# Patient Record
Sex: FEMALE | Race: WHITE
[De-identification: names, ages, dates, MRNs, and addresses within clinical notes are randomized per-mention and may not be internally consistent; named-entity substitution may affect disease eponyms.]

---

## 2022-05-10 ENCOUNTER — HOSPITAL ENCOUNTER (INPATIENT)
Dept: HOSPITAL 54 - ER | Age: 62
LOS: 15 days | Discharge: NURSING, REHAB OR LONG-TERM CARE | DRG: 885 | End: 2022-05-25
Attending: INTERNAL MEDICINE | Admitting: PSYCHIATRY & NEUROLOGY
Payer: MEDICARE

## 2022-05-10 VITALS — WEIGHT: 155 LBS | BODY MASS INDEX: 23.49 KG/M2 | HEIGHT: 68 IN

## 2022-05-10 VITALS — SYSTOLIC BLOOD PRESSURE: 125 MMHG | DIASTOLIC BLOOD PRESSURE: 91 MMHG

## 2022-05-10 VITALS — SYSTOLIC BLOOD PRESSURE: 144 MMHG | DIASTOLIC BLOOD PRESSURE: 65 MMHG

## 2022-05-10 DIAGNOSIS — E03.9: ICD-10-CM

## 2022-05-10 DIAGNOSIS — Z20.822: ICD-10-CM

## 2022-05-10 DIAGNOSIS — M62.81: ICD-10-CM

## 2022-05-10 DIAGNOSIS — E87.6: ICD-10-CM

## 2022-05-10 DIAGNOSIS — F41.9: ICD-10-CM

## 2022-05-10 DIAGNOSIS — F31.64: ICD-10-CM

## 2022-05-10 DIAGNOSIS — F29: Primary | ICD-10-CM

## 2022-05-10 DIAGNOSIS — Z73.6: ICD-10-CM

## 2022-05-10 DIAGNOSIS — K21.9: ICD-10-CM

## 2022-05-10 DIAGNOSIS — G89.4: ICD-10-CM

## 2022-05-10 DIAGNOSIS — F02.80: ICD-10-CM

## 2022-05-10 DIAGNOSIS — G31.83: ICD-10-CM

## 2022-05-10 LAB
ALBUMIN SERPL BCP-MCNC: 4.2 G/DL (ref 3.4–5)
ALP SERPL-CCNC: 84 U/L (ref 46–116)
ALT SERPL W P-5'-P-CCNC: 26 U/L (ref 12–78)
APAP SERPL-MCNC: < 10 UG/ML (ref 10–30)
AST SERPL W P-5'-P-CCNC: 17 U/L (ref 15–37)
BASOPHILS # BLD AUTO: 0 K/UL (ref 0–0.2)
BASOPHILS NFR BLD AUTO: 0.2 % (ref 0–2)
BILIRUB DIRECT SERPL-MCNC: 0.1 MG/DL (ref 0–0.2)
BILIRUB SERPL-MCNC: 0.2 MG/DL (ref 0.2–1)
BILIRUB UR QL STRIP: NEGATIVE
BUN SERPL-MCNC: 10 MG/DL (ref 7–18)
CALCIUM SERPL-MCNC: 9.6 MG/DL (ref 8.5–10.1)
CHLORIDE SERPL-SCNC: 107 MMOL/L (ref 98–107)
CO2 SERPL-SCNC: 28 MMOL/L (ref 21–32)
COLOR UR: YELLOW
CREAT SERPL-MCNC: 0.4 MG/DL (ref 0.6–1.3)
EOSINOPHIL NFR BLD AUTO: 1.4 % (ref 0–6)
ETHANOL SERPL-MCNC: < 3 MG/DL (ref 0–0)
GLUCOSE SERPL-MCNC: 117 MG/DL (ref 74–106)
GLUCOSE UR STRIP-MCNC: NEGATIVE MG/DL
HCT VFR BLD AUTO: 41 % (ref 33–45)
HGB BLD-MCNC: 13.3 G/DL (ref 11.5–14.8)
LEUKOCYTE ESTERASE UR QL STRIP: NEGATIVE
LYMPHOCYTES NFR BLD AUTO: 3.1 K/UL (ref 0.8–4.8)
LYMPHOCYTES NFR BLD AUTO: 35 % (ref 20–44)
MCHC RBC AUTO-ENTMCNC: 33 G/DL (ref 31–36)
MCV RBC AUTO: 90 FL (ref 82–100)
MONOCYTES NFR BLD AUTO: 0.5 K/UL (ref 0.1–1.3)
MONOCYTES NFR BLD AUTO: 5.2 % (ref 2–12)
NEUTROPHILS # BLD AUTO: 5.1 K/UL (ref 1.8–8.9)
NEUTROPHILS NFR BLD AUTO: 58.2 % (ref 43–81)
NITRITE UR QL STRIP: NEGATIVE
PH UR STRIP: 6 [PH] (ref 5–8)
PLATELET # BLD AUTO: 250 K/UL (ref 150–450)
POTASSIUM SERPL-SCNC: 2.9 MMOL/L (ref 3.5–5.1)
PROT SERPL-MCNC: 7.8 G/DL (ref 6.4–8.2)
PROT UR QL STRIP: NEGATIVE MG/DL
RBC # BLD AUTO: 4.53 MIL/UL (ref 4–5.2)
SODIUM SERPL-SCNC: 143 MMOL/L (ref 136–145)
UROBILINOGEN UR STRIP-MCNC: 0.2 EU/DL
WBC NRBC COR # BLD AUTO: 8.7 K/UL (ref 4.3–11)

## 2022-05-10 PROCEDURE — C9803 HOPD COVID-19 SPEC COLLECT: HCPCS

## 2022-05-10 PROCEDURE — G0480 DRUG TEST DEF 1-7 CLASSES: HCPCS

## 2022-05-10 RX ADMIN — ATORVASTATIN CALCIUM SCH MG: 40 TABLET, FILM COATED ORAL at 17:17

## 2022-05-10 RX ADMIN — ZOLPIDEM TARTRATE PRN MG: 5 TABLET, FILM COATED ORAL at 21:26

## 2022-05-10 RX ADMIN — OXYCODONE HYDROCHLORIDE AND ACETAMINOPHEN PRN UDTAB: 5; 325 TABLET ORAL at 11:09

## 2022-05-10 RX ADMIN — OXYCODONE HYDROCHLORIDE AND ACETAMINOPHEN PRN UDTAB: 5; 325 TABLET ORAL at 17:18

## 2022-05-10 RX ADMIN — OLANZAPINE SCH MG: 5 TABLET, ORALLY DISINTEGRATING ORAL at 12:56

## 2022-05-10 RX ADMIN — LATANOPROST SCH ML: 50 SOLUTION OPHTHALMIC at 21:34

## 2022-05-10 RX ADMIN — FAMOTIDINE SCH MG: 20 TABLET, FILM COATED ORAL at 21:26

## 2022-05-10 RX ADMIN — OLANZAPINE SCH MG: 5 TABLET, ORALLY DISINTEGRATING ORAL at 16:09

## 2022-05-10 NOTE — NUR
JOSE FROM [facility] PRE [facility]YCH ADMISSION. ON 5250 
FOR GD. PATIENT IN BED 12 AWAITING MD PIERRE.

## 2022-05-10 NOTE — NUR
Clinical Social Work Note:



Patient placed on a 5150 for GD due to pt being delusional at home and has been knocking on 
neighbors doors. Patient has been experiencing hallucinations at home. Patient then placed 
on a 5250 for GD. Patient currently resides at [address] [place] [address]. SW was 
able to locate pt's mother Luzma Hoffman ([phone]) to gather information and mother who is 
elderly 92. She reported she lives with pt and takes care of pt. Mother reported this was 
her first episode but has been in and out of [facility].

## 2022-05-10 NOTE — NUR
ORALIA Initial Discharge Plan:

Patient currently resides at [address] [place] [address]. Patient unable to state 
who she lives with. ORALIA was able to located pt's mother Luzma Hoffman ([phone]) to gather 
information and mother who is elderly 92. She reported she lives with pt and takes care of 
pt. Mother reported this was her first episode. ORALIA will work with the MD and treatment team 
to help coordinate appropriate discharge.

## 2022-05-10 NOTE — NUR
RN ADMIT NOTE- 63 Y/O Luxembourgish FEMALE ADMITTED ON PSYCHIATRIC HOLD FOR GD. PT FOUND TO BE 
HAVING HALLUCINATIONS, WANDERING NEIGHBORHOOD, KNOCKING ON NEIGHBORS DOORS AND WINDOWS, 
DISRUPTIVE AND UNABLE TO CARE FOR SELF. PT PRESENTS ALERT ORIENTED TO PERSON ONLY, BIZARRE 
GESTURES AND FACIAL EXPRESSIONS. STATES " EVERYONE GET DOWN. BOMBS ARE DROPPING." PT MADE 
FINGERS OF HAND INTO PISTOL SHAPE AND POINTED AT STAFF. PT STARES AND IS PARANOID AND 
GUARDED. VS- BP- 153/93, HR- 89, RR- 20, T- 98.0, O2 SATS 100% RA. CURRENTLY AMBULATORY, 
DIRECTABLE AND CALM THOUGH BIZARRE. NEEDS ATTENDED.L ADMIT AT THIS TIME. MD AWARE,. ORDERS 
RECEIVED. MONITOR FOR SAFETY AND BEHAVIORS.

## 2022-05-10 NOTE — NUR
GPS RN NOTES



PATIENT IN HER ROOM RESTING COMFORTABLY. AWAKE, ALERT AND ORIENTED X1, NO S/SX 

ACUTE DISTRESS NOTED. PATIENT REMAINS CONFUSED, TRYING TO GO INTO OTHER PATIENT'S ROOM, 
REDIRECTABLE. SAFETY PRECAUTIONS IN PLACE, WILL CONTINUE TO MONITOR Q15 MIN ROUNDS FOR 
SAFETY AND BEHAVIOR.

## 2022-05-10 NOTE — NUR
ORALIA Family Contact:

 

ORALIA contacted pt's mother Luzma ([phone]) to gather collateral. ORALIA was able to locate 
pt's mother Luzma Hoffman ([phone]) to gather information and mother who is elderly 92. 
She reported she lives with pt and takes care of pt. Mother reported this was her first 
episode but has been in and out of [facility].

## 2022-05-11 VITALS — SYSTOLIC BLOOD PRESSURE: 130 MMHG | DIASTOLIC BLOOD PRESSURE: 69 MMHG

## 2022-05-11 VITALS — DIASTOLIC BLOOD PRESSURE: 78 MMHG | SYSTOLIC BLOOD PRESSURE: 143 MMHG

## 2022-05-11 LAB
ALBUMIN SERPL BCP-MCNC: 3.6 G/DL (ref 3.4–5)
ALP SERPL-CCNC: 80 U/L (ref 46–116)
ALT SERPL W P-5'-P-CCNC: 29 U/L (ref 12–78)
AST SERPL W P-5'-P-CCNC: 32 U/L (ref 15–37)
BILIRUB SERPL-MCNC: 0.3 MG/DL (ref 0.2–1)
BUN SERPL-MCNC: 10 MG/DL (ref 7–18)
CALCIUM SERPL-MCNC: 9.5 MG/DL (ref 8.5–10.1)
CHLORIDE SERPL-SCNC: 107 MMOL/L (ref 98–107)
CHOLEST SERPL-MCNC: 179 MG/DL (ref ?–200)
CO2 SERPL-SCNC: 20 MMOL/L (ref 21–32)
CREAT SERPL-MCNC: 0.5 MG/DL (ref 0.6–1.3)
GLUCOSE SERPL-MCNC: 106 MG/DL (ref 74–106)
HDLC SERPL-MCNC: 62 MG/DL (ref 40–60)
LDLC SERPL DIRECT ASSAY-MCNC: 75 MG/DL (ref 0–99)
POTASSIUM SERPL-SCNC: 4.4 MMOL/L (ref 3.5–5.1)
PROT SERPL-MCNC: 7.5 G/DL (ref 6.4–8.2)
SODIUM SERPL-SCNC: 145 MMOL/L (ref 136–145)
TRIGL SERPL-MCNC: 233 MG/DL (ref 30–150)

## 2022-05-11 RX ADMIN — Medication SCH MG: at 09:44

## 2022-05-11 RX ADMIN — ACETAMINOPHEN PRN MG: 325 TABLET ORAL at 02:01

## 2022-05-11 RX ADMIN — OLANZAPINE SCH MG: 5 TABLET, ORALLY DISINTEGRATING ORAL at 16:48

## 2022-05-11 RX ADMIN — LATANOPROST SCH ML: 50 SOLUTION OPHTHALMIC at 21:19

## 2022-05-11 RX ADMIN — LORAZEPAM PRN MG: 0.5 TABLET ORAL at 00:23

## 2022-05-11 RX ADMIN — FAMOTIDINE SCH MG: 20 TABLET, FILM COATED ORAL at 21:14

## 2022-05-11 RX ADMIN — PANTOPRAZOLE SODIUM SCH MG: 40 TABLET, DELAYED RELEASE ORAL at 09:42

## 2022-05-11 RX ADMIN — OLANZAPINE SCH MG: 5 TABLET, ORALLY DISINTEGRATING ORAL at 09:00

## 2022-05-11 RX ADMIN — ATORVASTATIN CALCIUM SCH MG: 40 TABLET, FILM COATED ORAL at 17:45

## 2022-05-11 RX ADMIN — OXYCODONE HYDROCHLORIDE AND ACETAMINOPHEN PRN UDTAB: 5; 325 TABLET ORAL at 21:15

## 2022-05-11 RX ADMIN — LEVOTHYROXINE SODIUM SCH MCG: 125 TABLET ORAL at 09:42

## 2022-05-11 NOTE — NUR
Pt. pushed the front door and able to go out from the unit and staff able to bring her back 
and became combative and aggressive. Dr. Kendrick made aware and ordered Zyprexa 10 mg IM.

-------------------------------------------------------------------------------

Addendum: 05/11/22 at 1329 by LISA ELIZABETH RN

-------------------------------------------------------------------------------

Pts. mother made aware of the incident.

## 2022-05-11 NOTE — NUR
GPS RN OPENING NOTES:

RECEIVED PATIENT IN HALLWAY SITTING IN MICKY CHAIR, A/O X1, PASSIVE, WITHDRAWN,  
DISORGANIZED, DISORIENTED, CONFUSED. DENIES SI/HI AT THIS TIME. DENIES PAIN AT THIS TIME. NO 
S/S OF DISTRESS. RESPIRATION EVEN AND UNLABORED WITH EQUAL RISE AND FALL OF THE CHEST, ON 
ROOM AIR. OFFERED FLUID AND SNACKS AS TOLERATED. WILL CONTINUE TO MONITOR Q15 FOR MOOD, 
SAFETY AND BEHAVIOR.

## 2022-05-11 NOTE — NUR
Pt. seen in sitting in the floor in the hallway.  saw and said she did not 
fall, she put herself in the floor. Per pt. she did not fall and said she was tired.

## 2022-05-12 VITALS — DIASTOLIC BLOOD PRESSURE: 76 MMHG | SYSTOLIC BLOOD PRESSURE: 134 MMHG

## 2022-05-12 VITALS — DIASTOLIC BLOOD PRESSURE: 74 MMHG | SYSTOLIC BLOOD PRESSURE: 126 MMHG

## 2022-05-12 VITALS — DIASTOLIC BLOOD PRESSURE: 59 MMHG | SYSTOLIC BLOOD PRESSURE: 107 MMHG

## 2022-05-12 RX ADMIN — OLANZAPINE SCH MG: 5 TABLET, ORALLY DISINTEGRATING ORAL at 08:31

## 2022-05-12 RX ADMIN — ZOLPIDEM TARTRATE PRN MG: 5 TABLET, FILM COATED ORAL at 23:50

## 2022-05-12 RX ADMIN — LATANOPROST SCH ML: 50 SOLUTION OPHTHALMIC at 21:23

## 2022-05-12 RX ADMIN — PANTOPRAZOLE SODIUM SCH MG: 40 TABLET, DELAYED RELEASE ORAL at 08:31

## 2022-05-12 RX ADMIN — ATORVASTATIN CALCIUM SCH MG: 40 TABLET, FILM COATED ORAL at 17:12

## 2022-05-12 RX ADMIN — OXYCODONE HYDROCHLORIDE AND ACETAMINOPHEN PRN UDTAB: 5; 325 TABLET ORAL at 21:31

## 2022-05-12 RX ADMIN — OXYCODONE HYDROCHLORIDE AND ACETAMINOPHEN PRN UDTAB: 5; 325 TABLET ORAL at 14:21

## 2022-05-12 RX ADMIN — LEVOTHYROXINE SODIUM SCH MCG: 125 TABLET ORAL at 08:31

## 2022-05-12 RX ADMIN — Medication SCH MG: at 08:31

## 2022-05-12 RX ADMIN — OLANZAPINE SCH MG: 5 TABLET, ORALLY DISINTEGRATING ORAL at 16:18

## 2022-05-12 RX ADMIN — FAMOTIDINE SCH MG: 20 TABLET, FILM COATED ORAL at 21:22

## 2022-05-12 NOTE — NUR
RN NOTE: PAIN



PATIENT C/O LOWER BACK PAIN 8/10 AND WANTED TO TAKE PERCOCET . PRN PERCOCET 5/325 PO 
ADMINISTERED. WILL CONTINUE TO MONITOR.

## 2022-05-12 NOTE — NUR
RN Notes:

Received pt. awake in bed, responsive to staffs, quiet and no distress and no agitation 
noted. Ate 100% for breakfast, compliant on meds. Encouraged to verbalize feelings and 
motivated to attend group activity. Will continue to monitor for safety.

## 2022-05-12 NOTE — NUR
RN NOTES: PATIENT IN HER ROOM RESTING . AWAKE, ALERT, NO S/SX ACUTE DISTRESS NOTED. PATIENT 
REMAINS CONFUSED, PT. TOOK SHOWER, NEEDS FREQUENTLY REDIRECTIONS, REDIRECTABLE. SAFETY 
PRECAUTIONS IN PLACE, WILL CONTINUE TO MONITOR Q15 MIN ROUNDS FOR SAFETY AND BEHAVIOR.

## 2022-05-12 NOTE — NUR
RN NOTES: INSOMNIA:

PT. C/O UNABLE TO SLEEP , PRN AMBIEN 5 MG PO GIVEN PER PT. REQUEST, WILL CONTINUE TO 
MONITOR.

## 2022-05-13 VITALS — SYSTOLIC BLOOD PRESSURE: 133 MMHG | DIASTOLIC BLOOD PRESSURE: 86 MMHG

## 2022-05-13 VITALS — SYSTOLIC BLOOD PRESSURE: 122 MMHG | DIASTOLIC BLOOD PRESSURE: 76 MMHG

## 2022-05-13 VITALS — DIASTOLIC BLOOD PRESSURE: 74 MMHG | SYSTOLIC BLOOD PRESSURE: 132 MMHG

## 2022-05-13 RX ADMIN — CYCLOBENZAPRINE HYDROCHLORIDE PRN MG: 10 TABLET, FILM COATED ORAL at 09:04

## 2022-05-13 RX ADMIN — LATANOPROST SCH ML: 50 SOLUTION OPHTHALMIC at 21:06

## 2022-05-13 RX ADMIN — FAMOTIDINE SCH MG: 20 TABLET, FILM COATED ORAL at 21:04

## 2022-05-13 RX ADMIN — OXYCODONE HYDROCHLORIDE AND ACETAMINOPHEN PRN UDTAB: 5; 325 TABLET ORAL at 09:33

## 2022-05-13 RX ADMIN — OLANZAPINE SCH MG: 5 TABLET, ORALLY DISINTEGRATING ORAL at 09:00

## 2022-05-13 RX ADMIN — LEVOTHYROXINE SODIUM SCH MCG: 125 TABLET ORAL at 09:00

## 2022-05-13 RX ADMIN — ATORVASTATIN CALCIUM SCH MG: 40 TABLET, FILM COATED ORAL at 18:15

## 2022-05-13 RX ADMIN — ZOLPIDEM TARTRATE PRN MG: 5 TABLET, FILM COATED ORAL at 22:29

## 2022-05-13 RX ADMIN — Medication SCH MG: at 09:00

## 2022-05-13 RX ADMIN — PANTOPRAZOLE SODIUM SCH MG: 40 TABLET, DELAYED RELEASE ORAL at 09:00

## 2022-05-13 RX ADMIN — OLANZAPINE SCH MG: 5 TABLET, ORALLY DISINTEGRATING ORAL at 18:15

## 2022-05-13 RX ADMIN — LORAZEPAM PRN MG: 0.5 TABLET ORAL at 19:57

## 2022-05-13 NOTE — NUR
RN NOTES: PATIENT IN HER ROOM RESTING . AWAKE, ALERT, NO S/SX ACUTE DISTRESS NOTED. PATIENT 
REMAINS CONFUSED, PT. ANXIOUS EASILY AGITATED, NEEDS FREQUENTLY REDIRECTIONS, REDIRECTABLE. 
SAFETY PRECAUTIONS IN PLACE, WILL CONTINUE TO MONITOR Q15 MIN ROUNDS FOR SAFETY AND 
BEHAVIOR.

## 2022-05-13 NOTE — NUR
RN NOTES: ANXIETY 

PT. C/O FEELING ANXIOUS ,PACING IN ROOM,RESTLESS,PARANOID  PRN ATIVAN 1 MG PO GIVEN , WILL 
CONTINUE TO MONITOR.

## 2022-05-14 VITALS — DIASTOLIC BLOOD PRESSURE: 86 MMHG | SYSTOLIC BLOOD PRESSURE: 139 MMHG

## 2022-05-14 VITALS — SYSTOLIC BLOOD PRESSURE: 146 MMHG | DIASTOLIC BLOOD PRESSURE: 87 MMHG

## 2022-05-14 VITALS — DIASTOLIC BLOOD PRESSURE: 88 MMHG | SYSTOLIC BLOOD PRESSURE: 109 MMHG

## 2022-05-14 LAB — TSH SERPL DL<=0.005 MIU/L-ACNC: 3.36 UIU/ML (ref 0.36–3.74)

## 2022-05-14 RX ADMIN — LORAZEPAM PRN MG: 0.5 TABLET ORAL at 10:58

## 2022-05-14 RX ADMIN — OLANZAPINE SCH MG: 5 TABLET, ORALLY DISINTEGRATING ORAL at 17:38

## 2022-05-14 RX ADMIN — LATANOPROST SCH DROP: 50 SOLUTION OPHTHALMIC at 21:24

## 2022-05-14 RX ADMIN — OLANZAPINE SCH MG: 5 TABLET, ORALLY DISINTEGRATING ORAL at 08:05

## 2022-05-14 RX ADMIN — OXYCODONE HYDROCHLORIDE AND ACETAMINOPHEN PRN UDTAB: 5; 325 TABLET ORAL at 18:37

## 2022-05-14 RX ADMIN — FAMOTIDINE SCH MG: 20 TABLET, FILM COATED ORAL at 21:24

## 2022-05-14 RX ADMIN — OXYCODONE HYDROCHLORIDE AND ACETAMINOPHEN PRN UDTAB: 5; 325 TABLET ORAL at 11:53

## 2022-05-14 RX ADMIN — ATORVASTATIN CALCIUM SCH MG: 40 TABLET, FILM COATED ORAL at 17:38

## 2022-05-14 RX ADMIN — LEVOTHYROXINE SODIUM SCH MCG: 125 TABLET ORAL at 08:05

## 2022-05-14 RX ADMIN — Medication SCH MG: at 08:05

## 2022-05-14 RX ADMIN — ZOLPIDEM TARTRATE PRN MG: 5 TABLET, FILM COATED ORAL at 21:24

## 2022-05-14 RX ADMIN — PANTOPRAZOLE SODIUM SCH MG: 40 TABLET, DELAYED RELEASE ORAL at 08:05

## 2022-05-14 NOTE — NUR
GPS RN NOTE, PATIENT HAS A COMPLAINT OF NOT BEING ABLE TO SLEEP AND IS REQUESTING AMBIEN AT 
THIS TIME.  PATIENT VITAL SIGNS ARE STABLE.  GAVE AMBIEN 5MG PO HS PRN AS ORDERED.  WILL 
REASSESS FOR INSOMNIA AND I WILL CONTINUE TO MONITOR THIS PATIENT WITH THE HELP OF STAFF.

## 2022-05-14 NOTE — NUR
GPS RN NOTE, RECEIVED PATIENT AWAKE AND IN BED, NO S/S OR COMPLAINTS OF PAIN AT THIS TIME.  
PATIENT IS DISPLAYING NO S/S OF APPARENT DISTRESS AT THIS TIME.  PATIENT BREATHING IS 
UNLABORED WITH EQUAL RISE AND FALL OF THE CHEST.  PATIENT IS ALERT AND ORIENTED X 2 ON ROOM 
AIR WITH A SPO2 99%.  PATIENT IS COMPLIANT WITH MEDICATIONS, ANXIOUS, MAKES NEEDS KNOWN, AND 
COOPERATIVE.  PATIENT DOES HAVE AUDITORY HALLUCINATIONS TELLING HER THAT HER SISTER IS UP 
STAIRS ON THE THIRD FLOOR.  PATIENT DENIES SUICIDAL AND HOMICIDAL IDEATIONS AT THIS TIME.  
PATIENT ASSISTED WITH TURNING AND REPOSITIONING Q2HR AND PRN FOR COMFORT AND CIRCULATION.  
PATIENT HAS NO NEEDS AT THIS TIME.  PATIENT EDUCATED ON THE USE OF THE CALL BELL.  PATIENT 
BED SIDE RAILS UP X 2 FOR SAFETY.  PATIENT BED IS LOCKED, LOW, WITH BED ALARM ON.  WILL 
CONTINUE TO MONITOR THIS PATIENT Q15 MINUTES WITH THE HELP OF STAFF TO MAINTAIN SAFETY.

## 2022-05-14 NOTE — NUR
GPS/RN PT. IS ANXIOUS, PACING IN  THE HALLWAY, RESTLESS,PARANOID  PRN ATIVAN 1 MG PO GIVEN , 
WILL CONTINUE TO MONITOR.

## 2022-05-15 VITALS — DIASTOLIC BLOOD PRESSURE: 84 MMHG | SYSTOLIC BLOOD PRESSURE: 120 MMHG

## 2022-05-15 VITALS — SYSTOLIC BLOOD PRESSURE: 125 MMHG | DIASTOLIC BLOOD PRESSURE: 83 MMHG

## 2022-05-15 VITALS — DIASTOLIC BLOOD PRESSURE: 93 MMHG | SYSTOLIC BLOOD PRESSURE: 114 MMHG

## 2022-05-15 RX ADMIN — OXYCODONE HYDROCHLORIDE AND ACETAMINOPHEN PRN UDTAB: 5; 325 TABLET ORAL at 08:49

## 2022-05-15 RX ADMIN — OLANZAPINE SCH MG: 5 TABLET, ORALLY DISINTEGRATING ORAL at 08:32

## 2022-05-15 RX ADMIN — OLANZAPINE SCH MG: 5 TABLET, ORALLY DISINTEGRATING ORAL at 17:22

## 2022-05-15 RX ADMIN — ACETAMINOPHEN PRN MG: 325 TABLET ORAL at 19:29

## 2022-05-15 RX ADMIN — CYCLOBENZAPRINE HYDROCHLORIDE PRN MG: 10 TABLET, FILM COATED ORAL at 22:16

## 2022-05-15 RX ADMIN — LORAZEPAM PRN MG: 0.5 TABLET ORAL at 19:26

## 2022-05-15 RX ADMIN — LEVOTHYROXINE SODIUM SCH MCG: 125 TABLET ORAL at 06:33

## 2022-05-15 RX ADMIN — LATANOPROST SCH DROP: 50 SOLUTION OPHTHALMIC at 22:16

## 2022-05-15 RX ADMIN — ATORVASTATIN CALCIUM SCH MG: 40 TABLET, FILM COATED ORAL at 17:22

## 2022-05-15 RX ADMIN — LORAZEPAM PRN MG: 0.5 TABLET ORAL at 04:44

## 2022-05-15 RX ADMIN — Medication SCH MG: at 08:32

## 2022-05-15 RX ADMIN — PANTOPRAZOLE SODIUM SCH MG: 40 TABLET, DELAYED RELEASE ORAL at 06:33

## 2022-05-15 RX ADMIN — FAMOTIDINE SCH MG: 20 TABLET, FILM COATED ORAL at 22:16

## 2022-05-15 NOTE — NUR
GPS/RN RECEIVED PT RESTING IN HER BED, PASSIVE, WITHDRAWN,  GUARDED, DISORGANIZED, 
DISORIENTED. DENIES SI/HI AT THIS TIME. DENIES PAIN AT THIS TIME. NO S/S OF ACUTE DISTRESS. 
DAILY MEDS COMPLIANT. AMBULATORY.ALL NEEDS 

ATTENDED AND ANTICIPATED. WILL CONTINUE TO MONITOR Q15 FOR MOOD, SAFETY AND BEHAVIOR.

## 2022-05-15 NOTE — NUR
GPS RN NOTES:

PATIENT IS AGITATED, COMBATIVE, BANGING ON WINDOWS AND RUNNING TOWARDS EXIT DOOR. REFUSING 
REDIRECTION. ATIVAN 0.5MG 2TABS GIVEN PO PRN AS ORDERED. WILL CONTINUE TO MONITOR.

## 2022-05-15 NOTE — NUR
GPS RN NOTE, PATIENT HAS A COMPLAINT OF FEELING ANXIOUS AND IS REQUESTING ATIVAN AT THIS 
TIME.  PATIENT VITAL SIGNS ARE STABLE.  GAVE ATIVAN 1MG PO Q6HR PRN AS ORDERED.  WILL 
REASSESS FOR ANXIETY AND I WILL CONTINUE TO MONITOR THIS PATIENT WITH THE HELP OF STAFF.

## 2022-05-15 NOTE — NUR
GPS RN OPENING NOTES:

RECEIVED PATIENT AGITATED, COMBATIVE, BANGING ON WINDOW, RUNNING TOWARDS EXIT DOOR, 
DISORGANIZED, DISORIENTED, CONFUSED, PARANOID, REFUSING REDIRECTION. ATIVAN 1MG GIVEN PO. NO 
S/S OF DISTRESS. RESPIRATION EVEN AND UNLABORED WITH EQUAL RISE AND FALL OF THE CHEST, ON 
ROOM AIR. OFFERED FLUID AND SNACKS AS TOLERATED. WILL CONTINUE TO MONITOR Q15 FOR MOOD, 
SAFETY AND BEHAVIOR.

## 2022-05-16 VITALS — DIASTOLIC BLOOD PRESSURE: 76 MMHG | SYSTOLIC BLOOD PRESSURE: 122 MMHG

## 2022-05-16 VITALS — SYSTOLIC BLOOD PRESSURE: 121 MMHG | DIASTOLIC BLOOD PRESSURE: 74 MMHG

## 2022-05-16 VITALS — DIASTOLIC BLOOD PRESSURE: 73 MMHG | SYSTOLIC BLOOD PRESSURE: 128 MMHG

## 2022-05-16 RX ADMIN — LORAZEPAM PRN MG: 0.5 TABLET ORAL at 14:54

## 2022-05-16 RX ADMIN — ATORVASTATIN CALCIUM SCH MG: 40 TABLET, FILM COATED ORAL at 17:05

## 2022-05-16 RX ADMIN — PANTOPRAZOLE SODIUM SCH MG: 40 TABLET, DELAYED RELEASE ORAL at 08:08

## 2022-05-16 RX ADMIN — OXYCODONE HYDROCHLORIDE AND ACETAMINOPHEN PRN UDTAB: 5; 325 TABLET ORAL at 22:18

## 2022-05-16 RX ADMIN — FAMOTIDINE SCH MG: 20 TABLET, FILM COATED ORAL at 21:30

## 2022-05-16 RX ADMIN — Medication SCH MG: at 08:08

## 2022-05-16 RX ADMIN — OLANZAPINE SCH MG: 5 TABLET, ORALLY DISINTEGRATING ORAL at 10:03

## 2022-05-16 RX ADMIN — LEVOTHYROXINE SODIUM SCH MCG: 125 TABLET ORAL at 08:08

## 2022-05-16 RX ADMIN — OLANZAPINE SCH MG: 5 TABLET, ORALLY DISINTEGRATING ORAL at 17:05

## 2022-05-16 RX ADMIN — LORAZEPAM PRN MG: 0.5 TABLET ORAL at 22:20

## 2022-05-16 RX ADMIN — OLANZAPINE SCH MG: 5 TABLET, ORALLY DISINTEGRATING ORAL at 08:08

## 2022-05-16 RX ADMIN — LORAZEPAM PRN MG: 0.5 TABLET ORAL at 08:08

## 2022-05-16 RX ADMIN — LATANOPROST SCH DROP: 50 SOLUTION OPHTHALMIC at 21:30

## 2022-05-16 RX ADMIN — OXYCODONE HYDROCHLORIDE AND ACETAMINOPHEN PRN UDTAB: 5; 325 TABLET ORAL at 03:11

## 2022-05-16 RX ADMIN — OXYCODONE HYDROCHLORIDE AND ACETAMINOPHEN PRN UDTAB: 5; 325 TABLET ORAL at 10:16

## 2022-05-16 NOTE — NUR
GPS RN NOTES:

PATIENT C/O FOOT PAIN. PERCOCET 5/325MG 2TABS GIVEN PO AT 0311. WILL CONTINUE TO MONITOR.

## 2022-05-16 NOTE — NUR
PT RECEIVED



RESTING COMFORTABLY IN BED. NO S/S OR C/O PAIN OR DISTRESS NOTED. SIDE RAILS UP X2. WILL 
CONTINUE PLAN OF CARE.

## 2022-05-17 VITALS — DIASTOLIC BLOOD PRESSURE: 74 MMHG | SYSTOLIC BLOOD PRESSURE: 132 MMHG

## 2022-05-17 VITALS — SYSTOLIC BLOOD PRESSURE: 136 MMHG | DIASTOLIC BLOOD PRESSURE: 82 MMHG

## 2022-05-17 VITALS — DIASTOLIC BLOOD PRESSURE: 68 MMHG | SYSTOLIC BLOOD PRESSURE: 112 MMHG

## 2022-05-17 RX ADMIN — ATORVASTATIN CALCIUM SCH MG: 40 TABLET, FILM COATED ORAL at 17:14

## 2022-05-17 RX ADMIN — LORAZEPAM PRN MG: 0.5 TABLET ORAL at 21:46

## 2022-05-17 RX ADMIN — ZOLPIDEM TARTRATE PRN MG: 5 TABLET, FILM COATED ORAL at 23:12

## 2022-05-17 RX ADMIN — LEVOTHYROXINE SODIUM SCH MCG: 125 TABLET ORAL at 07:45

## 2022-05-17 RX ADMIN — PANTOPRAZOLE SODIUM SCH MG: 40 TABLET, DELAYED RELEASE ORAL at 07:45

## 2022-05-17 RX ADMIN — Medication SCH MG: at 08:25

## 2022-05-17 RX ADMIN — OLANZAPINE SCH MG: 5 TABLET, ORALLY DISINTEGRATING ORAL at 08:27

## 2022-05-17 RX ADMIN — FAMOTIDINE SCH MG: 20 TABLET, FILM COATED ORAL at 21:05

## 2022-05-17 RX ADMIN — OLANZAPINE SCH MG: 5 TABLET, ORALLY DISINTEGRATING ORAL at 21:05

## 2022-05-17 RX ADMIN — OXYCODONE HYDROCHLORIDE AND ACETAMINOPHEN PRN UDTAB: 5; 325 TABLET ORAL at 07:45

## 2022-05-17 RX ADMIN — LATANOPROST SCH DROP: 50 SOLUTION OPHTHALMIC at 21:08

## 2022-05-17 NOTE — NUR
GPS RN NOTES



PATIENT IN HER ROOM RESTING COMFORTABLY. AWAKE, ALERT AND ORIENTED X1, NO S/SX 

ACUTE DISTRESS NOTED. PATIENT REMAINS CONFUSED, DISORIENTED, MENTALLY PREOCCUPIED AND 
REDIRECTABLE. VERBALIZATION OF FEELINGS ENCOURAGED. SAFETY PRECAUTIONS IN PLACE. WILL 
CONTINUE TO MONITOR Q15 MIN ROUNDS FOR SAFETY AND BEHAVIOR.

## 2022-05-18 VITALS — DIASTOLIC BLOOD PRESSURE: 73 MMHG | SYSTOLIC BLOOD PRESSURE: 126 MMHG

## 2022-05-18 VITALS — DIASTOLIC BLOOD PRESSURE: 82 MMHG | SYSTOLIC BLOOD PRESSURE: 138 MMHG

## 2022-05-18 VITALS — SYSTOLIC BLOOD PRESSURE: 112 MMHG | DIASTOLIC BLOOD PRESSURE: 64 MMHG

## 2022-05-18 RX ADMIN — LATANOPROST SCH DROP: 50 SOLUTION OPHTHALMIC at 21:07

## 2022-05-18 RX ADMIN — OLANZAPINE SCH MG: 5 TABLET, ORALLY DISINTEGRATING ORAL at 08:10

## 2022-05-18 RX ADMIN — ATORVASTATIN CALCIUM SCH MG: 40 TABLET, FILM COATED ORAL at 17:21

## 2022-05-18 RX ADMIN — PANTOPRAZOLE SODIUM SCH MG: 40 TABLET, DELAYED RELEASE ORAL at 07:47

## 2022-05-18 RX ADMIN — OXYCODONE HYDROCHLORIDE AND ACETAMINOPHEN PRN UDTAB: 5; 325 TABLET ORAL at 07:53

## 2022-05-18 RX ADMIN — OXYCODONE HYDROCHLORIDE AND ACETAMINOPHEN PRN UDTAB: 5; 325 TABLET ORAL at 01:19

## 2022-05-18 RX ADMIN — LORAZEPAM PRN MG: 0.5 TABLET ORAL at 05:44

## 2022-05-18 RX ADMIN — LEVOTHYROXINE SODIUM SCH MCG: 125 TABLET ORAL at 07:47

## 2022-05-18 RX ADMIN — Medication SCH MG: at 08:09

## 2022-05-18 RX ADMIN — OLANZAPINE SCH MG: 5 TABLET, ORALLY DISINTEGRATING ORAL at 21:05

## 2022-05-18 RX ADMIN — FAMOTIDINE SCH MG: 20 TABLET, FILM COATED ORAL at 21:05

## 2022-05-18 NOTE — NUR
SW Family Contact:

SW spoke with patient's mother Luzma and sister Evita ([phone]) to discuss pt's 
discharge plan. Mother and sister Evita stated that they would want pt to be more stable 
before returning back home. They reported that they cannot take care of pt at home at this 
time. Both reported that they would want pt to go to a nursing facility.

## 2022-05-18 NOTE — NUR
GPS RN NOTES



PATIENT IN HER ROOM RESTING COMFORTABLY. ALERT AND ORIENTED X1, NO S/SX ACUTE DISTRESS 
NOTED. PATIENT REMAINS CONFUSED, DISORIENTED, PREOCCUPIED TO HER OWN THOUGHTS AND TALKING TO 
SELF. VERBALIZATION OF FEELINGS ENCOURAGED. SAFETY PRECAUTIONS IN PLACE. WILL CONTINUE TO 
MONITOR Q15 MIN ROUNDS FOR 

SAFETY AND BEHAVIOR.

## 2022-05-18 NOTE — NUR
GPS RN NOTES:

PATIENT C/O GENERALIZED PAIN. PERCOCET 8/325MG 2TABS GIVEN . WILL CONTINUE TO MONITOR.

## 2022-05-18 NOTE — NUR
SNF Referral:

ORALIA sent referral to Michelle dallas from [facility] ([phone]) for placement. SW sent H & 
P, progress notes, and medication list.

## 2022-05-19 VITALS — DIASTOLIC BLOOD PRESSURE: 73 MMHG | SYSTOLIC BLOOD PRESSURE: 149 MMHG

## 2022-05-19 VITALS — SYSTOLIC BLOOD PRESSURE: 138 MMHG | DIASTOLIC BLOOD PRESSURE: 77 MMHG

## 2022-05-19 VITALS — DIASTOLIC BLOOD PRESSURE: 88 MMHG | SYSTOLIC BLOOD PRESSURE: 118 MMHG

## 2022-05-19 RX ADMIN — OXYCODONE HYDROCHLORIDE AND ACETAMINOPHEN PRN UDTAB: 5; 325 TABLET ORAL at 00:54

## 2022-05-19 RX ADMIN — OXYCODONE HYDROCHLORIDE AND ACETAMINOPHEN PRN UDTAB: 5; 325 TABLET ORAL at 18:21

## 2022-05-19 RX ADMIN — Medication SCH MG: at 08:34

## 2022-05-19 RX ADMIN — ATORVASTATIN CALCIUM SCH MG: 40 TABLET, FILM COATED ORAL at 17:18

## 2022-05-19 RX ADMIN — OLANZAPINE SCH MG: 5 TABLET, ORALLY DISINTEGRATING ORAL at 08:34

## 2022-05-19 RX ADMIN — LATANOPROST SCH DROP: 50 SOLUTION OPHTHALMIC at 21:10

## 2022-05-19 RX ADMIN — FAMOTIDINE SCH MG: 20 TABLET, FILM COATED ORAL at 21:10

## 2022-05-19 RX ADMIN — OLANZAPINE SCH MG: 5 TABLET, ORALLY DISINTEGRATING ORAL at 20:44

## 2022-05-19 RX ADMIN — PANTOPRAZOLE SODIUM SCH MG: 40 TABLET, DELAYED RELEASE ORAL at 08:34

## 2022-05-19 RX ADMIN — LEVOTHYROXINE SODIUM SCH MCG: 125 TABLET ORAL at 08:34

## 2022-05-19 NOTE — NUR
ORALIA Family Contact:

ORALIA contacted pt's sister Evita ([phone]) and left a detailed voicemail of pt being 
accepted at [facility]. SW requested to contact this writer back to further 
discuss.

## 2022-05-19 NOTE — NUR
RN NOTES: PATIENT IN HER ROOM RESTING . AWAKE, ALERT, NO S/SX ACUTE DISTRESS NOTED. PATIENT 
REMAINS CONFUSED, PT. ANXIOUS EASILY AGITATED, DISORGNIZED, PARANOID,CONFUSED NEEDS 
FREQUENTLY REDIRECTIONS, REDIRECTABLE. SAFETY PRECAUTIONS IN PLACE, WILL CONTINUE TO MONITOR 
Q15 MIN ROUNDS FOR SAFETY AND BEHAVIOR.

## 2022-05-19 NOTE — NUR
RN-NOTES

PATIENT IN HER ROOM SITTING IN BED AWAKE,ALERT X1,NO ACUTE DISTRESS NOTED. WILL ENDORSE TO 
NIGHT NURSE FOR CONTINUITY OF CARE.

## 2022-05-19 NOTE — NUR
RN-NOTES

PATIENT LYING IN BED AWAKE,ALERT X1,GUARDED,NOTED WITH EPISODE OF TALKING TO SELF,NO ACUTE 
DISTRESS NOTED.COMPLIANT WITH MEDICATIONS,ABLE TO AMBULATE STEADY GAIT. ALL NEEDS ATTENDED 
AND ANTICIPATED. WILL CONT.MONITORING FOR SAFETY AND BEHAVIOR.WILL ENDORSE TO INCOMING SHIFT 
FOR CONTINUITY OF CARE.

## 2022-05-20 VITALS — DIASTOLIC BLOOD PRESSURE: 69 MMHG | SYSTOLIC BLOOD PRESSURE: 133 MMHG

## 2022-05-20 VITALS — SYSTOLIC BLOOD PRESSURE: 123 MMHG | DIASTOLIC BLOOD PRESSURE: 60 MMHG

## 2022-05-20 VITALS — DIASTOLIC BLOOD PRESSURE: 81 MMHG | SYSTOLIC BLOOD PRESSURE: 134 MMHG

## 2022-05-20 RX ADMIN — OLANZAPINE SCH MG: 5 TABLET, ORALLY DISINTEGRATING ORAL at 21:08

## 2022-05-20 RX ADMIN — OXYCODONE HYDROCHLORIDE AND ACETAMINOPHEN PRN UDTAB: 5; 325 TABLET ORAL at 17:11

## 2022-05-20 RX ADMIN — OLANZAPINE SCH MG: 5 TABLET, ORALLY DISINTEGRATING ORAL at 08:09

## 2022-05-20 RX ADMIN — ATORVASTATIN CALCIUM SCH MG: 40 TABLET, FILM COATED ORAL at 17:11

## 2022-05-20 RX ADMIN — LATANOPROST SCH DROP: 50 SOLUTION OPHTHALMIC at 21:09

## 2022-05-20 RX ADMIN — LEVOTHYROXINE SODIUM SCH MCG: 125 TABLET ORAL at 08:08

## 2022-05-20 RX ADMIN — OXYCODONE HYDROCHLORIDE AND ACETAMINOPHEN PRN UDTAB: 5; 325 TABLET ORAL at 09:00

## 2022-05-20 RX ADMIN — LORAZEPAM PRN MG: 0.5 TABLET ORAL at 23:08

## 2022-05-20 RX ADMIN — FAMOTIDINE SCH MG: 20 TABLET, FILM COATED ORAL at 21:47

## 2022-05-20 RX ADMIN — Medication SCH MG: at 08:09

## 2022-05-20 RX ADMIN — PANTOPRAZOLE SODIUM SCH MG: 40 TABLET, DELAYED RELEASE ORAL at 08:08

## 2022-05-20 NOTE — NUR
RN-NOTES

PATIENT SITTING IN BED AWAKE,ALERT X1,GUARDED,NOTED WITH EPISODE OF TALKING TO SELF,NO ACUTE 
DISTRESS NOTED.COMPLIANT WITH MEDICATIONS,ABLE TO MAKE NEEDS KNOWN TO THE STAFF. ABLE TO 
AMBULATE STEADY GAIT. ALL NEEDS ATTENDED AND ANTICIPATED. WILL CONT.MONITORING FOR SAFETY 
AND BEHAVIOR.WILL ENDORSE TO INCOMING SHIFT FOR CONTINUITY OF CARE.

## 2022-05-20 NOTE — NUR
GPS RN NOTE: ANXIETY



PATIENT IS ANXIOUS, RESTLESS, PACING INTO OTHER PATIENT'S ROOM, PRN ATIVAN 1 MG PO 
ADMINISTERED.

## 2022-05-20 NOTE — NUR
ORALIA Family Contact:

SW contacted pt's sister Evita ([phone]) left a detailed voicemail that pt is 
accepted at [facility] and requesting for sister to contact this writer back 
due to possibly discharging patient next week.

## 2022-05-21 VITALS — SYSTOLIC BLOOD PRESSURE: 112 MMHG | DIASTOLIC BLOOD PRESSURE: 68 MMHG

## 2022-05-21 VITALS — SYSTOLIC BLOOD PRESSURE: 109 MMHG | DIASTOLIC BLOOD PRESSURE: 77 MMHG

## 2022-05-21 VITALS — SYSTOLIC BLOOD PRESSURE: 120 MMHG | DIASTOLIC BLOOD PRESSURE: 79 MMHG

## 2022-05-21 RX ADMIN — FAMOTIDINE SCH MG: 20 TABLET, FILM COATED ORAL at 21:25

## 2022-05-21 RX ADMIN — PANTOPRAZOLE SODIUM SCH MG: 40 TABLET, DELAYED RELEASE ORAL at 08:09

## 2022-05-21 RX ADMIN — OXYCODONE HYDROCHLORIDE AND ACETAMINOPHEN PRN UDTAB: 5; 325 TABLET ORAL at 00:05

## 2022-05-21 RX ADMIN — OLANZAPINE SCH MG: 5 TABLET, ORALLY DISINTEGRATING ORAL at 21:17

## 2022-05-21 RX ADMIN — Medication SCH MG: at 08:09

## 2022-05-21 RX ADMIN — OXYCODONE HYDROCHLORIDE AND ACETAMINOPHEN PRN UDTAB: 5; 325 TABLET ORAL at 10:30

## 2022-05-21 RX ADMIN — ACETAMINOPHEN PRN MG: 325 TABLET ORAL at 22:36

## 2022-05-21 RX ADMIN — OLANZAPINE SCH MG: 5 TABLET, ORALLY DISINTEGRATING ORAL at 08:09

## 2022-05-21 RX ADMIN — LORAZEPAM PRN MG: 0.5 TABLET ORAL at 14:22

## 2022-05-21 RX ADMIN — ZOLPIDEM TARTRATE PRN MG: 5 TABLET, FILM COATED ORAL at 22:52

## 2022-05-21 RX ADMIN — OXYCODONE HYDROCHLORIDE AND ACETAMINOPHEN PRN UDTAB: 5; 325 TABLET ORAL at 16:25

## 2022-05-21 RX ADMIN — LATANOPROST SCH DROP: 50 SOLUTION OPHTHALMIC at 21:22

## 2022-05-21 RX ADMIN — ATORVASTATIN CALCIUM SCH MG: 40 TABLET, FILM COATED ORAL at 17:23

## 2022-05-21 RX ADMIN — LEVOTHYROXINE SODIUM SCH MCG: 125 TABLET ORAL at 08:09

## 2022-05-21 NOTE — NUR
GPS RN NOTE: PAIN



PATIENT C/O RIGHT FOOT PAIN 3/10 AND REQUESTED TO TAKE PAIN MEDICATION. PRN TYLENOL 650 MG 
PO ADMINISTERED AS ORDERED. WILL CONTINUE TO MONITOR.

## 2022-05-21 NOTE — NUR
RN-NOTES

PATIENT IN THE DAY ROOM UP IN THE MICKY CHAIR AWAKE,ALERT X1,GUARDED NOTED PATIENT GOING INTO 
[facility] ROOM AND LAYING IN BED,REDIRECTED AND REORIENTED PATIENT. PATIENT STATED" NOTHING WRONG 
WITH IT,IT'S A JOKE". INSTRUCTED AND REEDUCATED PATIENT ON  HOSPITAL POLICIES. COMPLIANT 
WITH MEDICATIONS THIS SHIFT. PATIENT ABLE TO AMBULATE STEADY GAIT. ALL NEEDS ATTENDED AND 
ANTICIPATED. WILL CONT. MONITORING FOR SAFETY AND BEHAVIOR. WILL ENDORSE TO INCOMING NURSE 
FOR CONTINUITY OF CARE.

## 2022-05-21 NOTE — NUR
RN-NOTES

PATIENT STILL IN THE DAY ROOM UP ON THE MICKY CHAIR,INTERMITTENTLY SLEEPING.NO ACUTE DISTRESS 
NOTED. NOTED PATIENT WITH EPISODE OF MUMBLING AND TALKING TO SELF. ALL NEEDS ATTENDED AND 
ANTICIPATED. WILL CONT. MONITORING FOR SAFETY AND BEHAVIOR.

## 2022-05-21 NOTE — NUR
RN-NOTES

PATIENT IN THE ROOM AND CALLING THE WRITER, PATIENT LYING IN BED C/O  PAIN  ON HER LEFT FOOT 
AND REQUESTING FOR PERCOCET. PATIENT STATED" I BROKE MY LEFT LEG" UPON ASSESSMENT PATIENT 
ABLE TO MOVE AND WIGLE HER TOES,NO S/S OF ANY INJURY OR REDNESS ON THE SAID SITE. PATIENT 
ABLE TO AMBULATE WITHOUT ANY ASSISTANCE.WRITER ABOUT TO GIVE THE MEDICATIONS AND PATIENT 
ALREADY STANDING IN THE HALLWAY NEXT TO HER ROOM , WRITER ABOUT TO GIVE THE SAID MEDICATION 
BUT  PATIENT PURPOSELY LAY ON THE FLOOR. STATED" I NEED MY PAIN MEDICATION" . PERCOCET 
5/325MG 2 TABS. GIVEN AS PRN ORDER.THREE STAFF ASSISTED PATIENT UP THE MICKY CHAIR INCLUDING 
CHARGE NURSE AND BROUGHT TO THE DAY ROOM. WILL CONT. MONITORING FOR SAFETY AND BEHAVIOR.

## 2022-05-21 NOTE — NUR
GPS RN NOTE: PAIN



PATIENT C/O BILATERAL FOOT PAIN 8/10 AND WANTED TO TAKE PERCOCET ONLY AT THIS TIME. PRN 
PERCOCET 2 TABS PO ADMINISTERED AS ORDERED. WILL CONTINUE TO MONITOR.

## 2022-05-21 NOTE — NUR
RN-NOTES

NOTED PATIENT PACING AND RUNNING IN THE HALLWAY,TALKING TO SELF. REDIRECTED AND ATIVAN 1MG 
P.O GIVEN AS PRN ORDER. WILL CONT. MONITORING FOR SAFETY AND BEHAVIOR.

## 2022-05-21 NOTE — NUR
GPS RN NOTE: INSOMNIA



PATIENT C/O INABILITY TO SLEEP AND REQUESTED TO TAKE SLEEPING MEDICINE. PER PATIENT REQUEST, 
PRN AMBIEN 5 MG 1 TAB PO ADMINISTERED.

## 2022-05-22 VITALS — SYSTOLIC BLOOD PRESSURE: 116 MMHG | DIASTOLIC BLOOD PRESSURE: 51 MMHG

## 2022-05-22 VITALS — SYSTOLIC BLOOD PRESSURE: 115 MMHG | DIASTOLIC BLOOD PRESSURE: 63 MMHG

## 2022-05-22 VITALS — DIASTOLIC BLOOD PRESSURE: 70 MMHG | SYSTOLIC BLOOD PRESSURE: 131 MMHG

## 2022-05-22 RX ADMIN — OXYCODONE HYDROCHLORIDE AND ACETAMINOPHEN PRN UDTAB: 5; 325 TABLET ORAL at 21:40

## 2022-05-22 RX ADMIN — Medication SCH MG: at 08:35

## 2022-05-22 RX ADMIN — OLANZAPINE SCH MG: 5 TABLET, ORALLY DISINTEGRATING ORAL at 21:06

## 2022-05-22 RX ADMIN — FAMOTIDINE SCH MG: 20 TABLET, FILM COATED ORAL at 21:06

## 2022-05-22 RX ADMIN — LORAZEPAM PRN MG: 0.5 TABLET ORAL at 16:26

## 2022-05-22 RX ADMIN — ACETAMINOPHEN PRN MG: 325 TABLET ORAL at 21:36

## 2022-05-22 RX ADMIN — OLANZAPINE SCH MG: 5 TABLET, ORALLY DISINTEGRATING ORAL at 08:35

## 2022-05-22 RX ADMIN — ATORVASTATIN CALCIUM SCH MG: 40 TABLET, FILM COATED ORAL at 16:55

## 2022-05-22 RX ADMIN — OXYCODONE HYDROCHLORIDE AND ACETAMINOPHEN PRN UDTAB: 5; 325 TABLET ORAL at 12:35

## 2022-05-22 RX ADMIN — ZOLPIDEM TARTRATE PRN MG: 5 TABLET, FILM COATED ORAL at 21:06

## 2022-05-22 RX ADMIN — LATANOPROST SCH DROP: 50 SOLUTION OPHTHALMIC at 21:07

## 2022-05-22 RX ADMIN — LEVOTHYROXINE SODIUM SCH MCG: 125 TABLET ORAL at 08:35

## 2022-05-22 RX ADMIN — OXYCODONE HYDROCHLORIDE AND ACETAMINOPHEN PRN UDTAB: 5; 325 TABLET ORAL at 03:57

## 2022-05-22 RX ADMIN — PANTOPRAZOLE SODIUM SCH MG: 40 TABLET, DELAYED RELEASE ORAL at 08:35

## 2022-05-22 RX ADMIN — LORAZEPAM PRN MG: 0.5 TABLET ORAL at 21:06

## 2022-05-22 NOTE — NUR
GPS RN NOTE: PAIN



PATIENT C/O BACK PAIN 8/10 AND WANTED TO TAKE PERCOCET. PRN PERCOCET 2 TABS PO ADMINISTERED 
AS ORDERED. VITALS 119/72, 58, 18, 97.6, 98% AT RA. WILL CONTINUE TO MONITOR.

## 2022-05-22 NOTE — NUR
GPS/RN RECEIVED PT RESTING IN THE BED, PASSIVE, WITHDRAWN,  GUARDED, DISORGANIZED, 
DISORIENTED. DENIES SI/HI AT THIS TIME. DENIES PAIN AT THIS TIME. NO S/S OF ACUTE DISTRESS. 
DAILY MEDS COMPLIANT. AMBULATORY. ALL NEEDS ATTENDED AND ANTICIPATED. WILL CONTINUE TO 
MONITOR Q15 FOR MOOD, SAFETY AND BEHAVIOR.

## 2022-05-23 VITALS — SYSTOLIC BLOOD PRESSURE: 118 MMHG | DIASTOLIC BLOOD PRESSURE: 75 MMHG

## 2022-05-23 VITALS — SYSTOLIC BLOOD PRESSURE: 136 MMHG | DIASTOLIC BLOOD PRESSURE: 77 MMHG

## 2022-05-23 VITALS — SYSTOLIC BLOOD PRESSURE: 135 MMHG | DIASTOLIC BLOOD PRESSURE: 78 MMHG

## 2022-05-23 RX ADMIN — OXYCODONE HYDROCHLORIDE AND ACETAMINOPHEN PRN UDTAB: 5; 325 TABLET ORAL at 16:36

## 2022-05-23 RX ADMIN — OXYCODONE HYDROCHLORIDE AND ACETAMINOPHEN PRN UDTAB: 5; 325 TABLET ORAL at 10:37

## 2022-05-23 RX ADMIN — LEVOTHYROXINE SODIUM SCH MCG: 125 TABLET ORAL at 07:30

## 2022-05-23 RX ADMIN — ATORVASTATIN CALCIUM SCH MG: 40 TABLET, FILM COATED ORAL at 17:18

## 2022-05-23 RX ADMIN — FAMOTIDINE SCH MG: 20 TABLET, FILM COATED ORAL at 21:36

## 2022-05-23 RX ADMIN — OLANZAPINE SCH MG: 5 TABLET, ORALLY DISINTEGRATING ORAL at 09:02

## 2022-05-23 RX ADMIN — Medication SCH MG: at 09:03

## 2022-05-23 RX ADMIN — OLANZAPINE SCH MG: 5 TABLET, ORALLY DISINTEGRATING ORAL at 21:35

## 2022-05-23 RX ADMIN — LATANOPROST SCH DROP: 50 SOLUTION OPHTHALMIC at 22:02

## 2022-05-23 RX ADMIN — OXYCODONE HYDROCHLORIDE AND ACETAMINOPHEN PRN UDTAB: 5; 325 TABLET ORAL at 23:33

## 2022-05-23 RX ADMIN — PANTOPRAZOLE SODIUM SCH MG: 40 TABLET, DELAYED RELEASE ORAL at 07:30

## 2022-05-23 NOTE — NUR
GPS RN OPENING NOTES:

RECEIVED PATIENT IN ROOM, AWAKE A/O X1-2, DISORGANIZED, DISORIENTED, CONFUSED, PARANOID, 
TALKING TO SELF, REFUSING REDIRECTION. DENIES PAIN AT THIS TIME. DENIES SI AT THIS TIME. NO 
S/S OF DISTRESS. RESPIRATION EVEN AND UNLABORED WITH EQUAL RISE AND FALL OF THE CHEST, ON 
ROOM AIR. OFFERED FLUID AND SNACKS AS TOLERATED. BED IN LOW LOCKED POSITION, SIDE RAILS UP 
X2 FOR SAFETY. CALL BELL WITHIN REACH. WILL CONTINUE TO MONITOR Q15 FOR MOOD, SAFETY AND 
BEHAVIOR.

## 2022-05-23 NOTE — NUR
GPS RN NOTES:

PATIENT REQUESTED FOR PAIN MEDICATION DUE TO RIGHT FOOT PAIN. PERCOCET 5/325MG 2TABS GIVEN 
PO AT 2333. WILL CONTINUE TO MONITOR.

## 2022-05-23 NOTE — NUR
Individual Counseling: 

SW met with pt. to facilitate individual counseling. However, pt. is disorganized, 
disoriented. Pt. is not appropriate for therapeutic milieu at this time.

## 2022-05-23 NOTE — NUR
ORALIA Family Contact:

SW contacted pt's sister Evita ([phone]) and notified that pt is accepted at 
[facility]. She reported that she is agreeable of this plan and does not want 
pt back home because their mother who is 92 year old cannot care for her. She reported that 
she will speak to mother as well. Family is agreeable of nursing facility.

## 2022-05-24 VITALS — DIASTOLIC BLOOD PRESSURE: 68 MMHG | SYSTOLIC BLOOD PRESSURE: 136 MMHG

## 2022-05-24 VITALS — SYSTOLIC BLOOD PRESSURE: 129 MMHG | DIASTOLIC BLOOD PRESSURE: 82 MMHG

## 2022-05-24 VITALS — SYSTOLIC BLOOD PRESSURE: 136 MMHG | DIASTOLIC BLOOD PRESSURE: 68 MMHG

## 2022-05-24 VITALS — DIASTOLIC BLOOD PRESSURE: 79 MMHG | SYSTOLIC BLOOD PRESSURE: 118 MMHG

## 2022-05-24 RX ADMIN — OXYCODONE HYDROCHLORIDE AND ACETAMINOPHEN PRN UDTAB: 5; 325 TABLET ORAL at 14:31

## 2022-05-24 RX ADMIN — OXYCODONE HYDROCHLORIDE AND ACETAMINOPHEN PRN UDTAB: 5; 325 TABLET ORAL at 06:07

## 2022-05-24 RX ADMIN — OLANZAPINE SCH MG: 5 TABLET, ORALLY DISINTEGRATING ORAL at 08:00

## 2022-05-24 RX ADMIN — OXYCODONE HYDROCHLORIDE AND ACETAMINOPHEN PRN UDTAB: 5; 325 TABLET ORAL at 21:51

## 2022-05-24 RX ADMIN — LEVOTHYROXINE SODIUM SCH MCG: 125 TABLET ORAL at 08:00

## 2022-05-24 RX ADMIN — ACETAMINOPHEN PRN MG: 325 TABLET ORAL at 19:36

## 2022-05-24 RX ADMIN — LATANOPROST SCH DROP: 50 SOLUTION OPHTHALMIC at 22:42

## 2022-05-24 RX ADMIN — Medication SCH MG: at 08:00

## 2022-05-24 RX ADMIN — PANTOPRAZOLE SODIUM SCH MG: 40 TABLET, DELAYED RELEASE ORAL at 08:00

## 2022-05-24 RX ADMIN — FAMOTIDINE SCH MG: 20 TABLET, FILM COATED ORAL at 21:22

## 2022-05-24 RX ADMIN — ATORVASTATIN CALCIUM SCH MG: 40 TABLET, FILM COATED ORAL at 17:03

## 2022-05-24 RX ADMIN — OLANZAPINE SCH MG: 5 TABLET, ORALLY DISINTEGRATING ORAL at 21:22

## 2022-05-24 NOTE — NUR
RN OPENING NOTE:

PATIENT IN ROOM, AWAKE A/O X1-2, CONFUSED THOUGH BETTER THAN LAST WEEK,  DENIES PAIN AT THIS 
TIME. DENIES SI AT THIS TIME. NO S/S OF DISTRESS. RESPIRATION EVEN AND NON-LABORED,  ON ROOM 
AIR. OFFERED FLUID AND SNACKS AS TOLERATED. BED IN LOW LOCKED POSITION, SIDE RAILS UP X2 FOR 
SAFETY. CALL BELL WITHIN REACH. WILL CONTINUE TO MONITOR Q15 FOR MOOD, SAFETY AND BEHAVIOR.

## 2022-05-24 NOTE — NUR
RN NOTES



8880 -PT COMPLAINED OF PAIN RIGHT FOOT 8/10.PRN MEDS PERCOCET 5/325 MG 2TABS WAS 
GIVEN.EFFECTIVE.NO A/R NOTED.

## 2022-05-25 VITALS — SYSTOLIC BLOOD PRESSURE: 153 MMHG | DIASTOLIC BLOOD PRESSURE: 77 MMHG

## 2022-05-25 RX ADMIN — OXYCODONE HYDROCHLORIDE AND ACETAMINOPHEN PRN UDTAB: 5; 325 TABLET ORAL at 11:11

## 2022-05-25 RX ADMIN — OXYCODONE HYDROCHLORIDE AND ACETAMINOPHEN PRN UDTAB: 5; 325 TABLET ORAL at 04:58

## 2022-05-25 RX ADMIN — PANTOPRAZOLE SODIUM SCH MG: 40 TABLET, DELAYED RELEASE ORAL at 08:15

## 2022-05-25 RX ADMIN — Medication SCH MG: at 08:15

## 2022-05-25 RX ADMIN — LEVOTHYROXINE SODIUM SCH MCG: 125 TABLET ORAL at 08:15

## 2022-05-25 RX ADMIN — OLANZAPINE SCH MG: 5 TABLET, ORALLY DISINTEGRATING ORAL at 08:15

## 2022-05-25 NOTE — NUR
RN-DISCHARGE NOTES

PATIENT HAD A DISCHARGE ORDER FROM DR. BAXTER ( PSYCHIATRIST),DR. COLEMAN ( INTERNIST) 
MEDICALLY DISCHARGE PATIENT FOR DISCHARGE. REPORT WAS GIVEN TO JARED (FACILITY  SUPERVISOR). 
PATIENT DID NOT VERBALIZE SI/HI,DENIES VISUAL/AUDITORY HALLUCINATIONS AT THE TIME OF 
DISCHARGE. PATIENT LEFT THE UNIT IN STABLE CONDITION A/O X2, AMBULATORY STEADY GAIT.  
BY AMBULANCE VIA GURNEY  WITH TWO STAFF ASSIST.ALL BELONGINGS WAS GIVEN BACK TO THE PATIENT 
INCLUDING CASH MONEY OF $ 76.00, X1 BLACK CELLPHONE ,X1 BLACK WALLET,  X1 WATCH AND ALL 
OTHER BELONGINGS WITNESS BY TWO AMBULANCE STAFF.

## 2022-05-25 NOTE — NUR
SW Discharge Note:

Patient will be discharged to skilled nursing facility, [facility], located 
at [address] [place] [address] ([phone]). Please arrange ambulance 
transportation at 1PM.  spoke with Michelle dallas ([phone]), who stated 
patient will be accepted at the facility today. Patients sister Evita ([phone]) and 
mother Luzma ([phone]) are aware and agreeable of dc. Patient is alert and oriented x2 
and is unable to plan for self-care. Patient denies any suicidal or homicidal ideation. 
Patient is aware and agreeable with discharge plans. Patient will follow-up at the facility 
with Dr. Kendrick (psychiatrist) [address] [address] [address]; 
([phone]) and (Internist) Dr. Jones [address] [facility] [facility] [address], [place] 
[address]; ([phone]). Patient presents with euthymic mood and congruent affect.

## 2024-05-14 NOTE — NUR
RN NOTES



0500-PT COMPLAINED OF PAIN RIGHT FOOT 8/10.PRN MEDS PERCOCET 5/325 MG 2TABS WAS 
GIVEN.EFFECTIVE.NO A/R NOTED. DISPLAY PLAN FREE TEXT

## 2024-06-17 NOTE — NUR
Continue aspirin   GPS RN NOTES:

PATIENT C/O LOWER BACK PAIN. PERCOCET 5/325MG 2TABS GIVEN PO AT 2115. WILL CONTINUE TO 
MONITOR.